# Patient Record
Sex: FEMALE | Race: WHITE | URBAN - METROPOLITAN AREA
[De-identification: names, ages, dates, MRNs, and addresses within clinical notes are randomized per-mention and may not be internally consistent; named-entity substitution may affect disease eponyms.]

---

## 2017-02-21 ENCOUNTER — OFFICE VISIT (OUTPATIENT)
Dept: FAMILY MEDICINE | Facility: CLINIC | Age: 62
End: 2017-02-21
Payer: COMMERCIAL

## 2017-02-21 VITALS
OXYGEN SATURATION: 96 % | BODY MASS INDEX: 26.37 KG/M2 | WEIGHT: 168 LBS | DIASTOLIC BLOOD PRESSURE: 74 MMHG | HEART RATE: 69 BPM | SYSTOLIC BLOOD PRESSURE: 125 MMHG | TEMPERATURE: 98.1 F | HEIGHT: 67 IN

## 2017-02-21 DIAGNOSIS — M77.02 EPICONDYLITIS ELBOW, MEDIAL, LEFT: ICD-10-CM

## 2017-02-21 DIAGNOSIS — E78.5 HYPERLIPIDEMIA LDL GOAL <130: ICD-10-CM

## 2017-02-21 DIAGNOSIS — M70.52 BURSITIS OF LEFT KNEE: ICD-10-CM

## 2017-02-21 DIAGNOSIS — E03.4 HYPOTHYROIDISM DUE TO ACQUIRED ATROPHY OF THYROID: ICD-10-CM

## 2017-02-21 DIAGNOSIS — N95.1 PERIMENOPAUSAL: ICD-10-CM

## 2017-02-21 DIAGNOSIS — M70.51 BURSITIS OF RIGHT KNEE: ICD-10-CM

## 2017-02-21 DIAGNOSIS — M76.31 ILIOTIBIAL BAND TENDINITIS OF RIGHT SIDE: ICD-10-CM

## 2017-02-21 DIAGNOSIS — K21.9 GASTROESOPHAGEAL REFLUX DISEASE WITHOUT ESOPHAGITIS: ICD-10-CM

## 2017-02-21 DIAGNOSIS — Z76.89 ENCOUNTER TO ESTABLISH CARE: Primary | ICD-10-CM

## 2017-02-21 PROCEDURE — 99203 OFFICE O/P NEW LOW 30 MIN: CPT | Performed by: INTERNAL MEDICINE

## 2017-02-21 RX ORDER — LOVASTATIN 40 MG
TABLET ORAL
Refills: 1 | COMMUNITY
Start: 2016-08-23

## 2017-02-21 RX ORDER — LATANOPROST 50 UG/ML
SOLUTION/ DROPS OPHTHALMIC
Refills: 6 | COMMUNITY
Start: 2016-12-30

## 2017-02-21 RX ORDER — LIOTHYRONINE SODIUM 25 UG/1
TABLET ORAL
Refills: 2 | COMMUNITY
Start: 2017-02-13

## 2017-02-21 RX ORDER — MULTIPLE VITAMINS W/ MINERALS TAB 9MG-400MCG
1 TAB ORAL DAILY
COMMUNITY

## 2017-02-21 RX ORDER — ATORVASTATIN CALCIUM 40 MG/1
TABLET, FILM COATED ORAL
Refills: 1 | COMMUNITY
Start: 2017-02-13

## 2017-02-21 RX ORDER — FLUOXETINE 10 MG/1
CAPSULE ORAL
Refills: 0 | COMMUNITY
Start: 2017-02-13

## 2017-02-21 RX ORDER — CALCIUM CARBONATE 500(1250)
1200 TABLET ORAL 2 TIMES DAILY
COMMUNITY

## 2017-02-21 RX ORDER — ESTROGENS, CONJUGATED 0.45 MG/1
TABLET, FILM COATED ORAL
Refills: 11 | COMMUNITY
Start: 2016-12-30

## 2017-02-21 NOTE — MR AVS SNAPSHOT
"              After Visit Summary   2/21/2017    Rosey Urban    MRN: [id number]           Patient Information     Date Of Birth          1955        Visit Information        Provider Department      2/21/2017 8:00 AM Titus Villegas MD [facility]        Today's Diagnoses     Encounter to establish care    -  1    Epicondylitis elbow, medial, left        Perimenopausal        Gastroesophageal reflux disease without esophagitis        Hypothyroidism due to acquired atrophy of thyroid        Hyperlipidemia LDL goal <130        Bursitis of left knee        Bursitis of right knee        Iliotibial band tendinitis of right side           Follow-ups after your visit        Who to contact     If you have questions or need follow up information about today's clinic visit or your schedule please contact [facility] directly at [phone].  Normal or non-critical lab and imaging results will be communicated to you by MyChart, letter or phone within 4 business days after the clinic has received the results. If you do not hear from us within 7 days, please contact the clinic through MyChart or phone. If you have a critical or abnormal lab result, we will notify you by phone as soon as possible.  Submit refill requests through Marco Vasco or call your pharmacy and they will forward the refill request to us. Please allow 3 business days for your refill to be completed.          Additional Information About Your Visit        MyChart Information     Marco Vasco lets you send messages to your doctor, view your test results, renew your prescriptions, schedule appointments and more. To sign up, go to [email or web address] . Click on \"Log in\" on the left side of the screen, which will take you to the Welcome page. Then click on \"Sign up Now\" on the right side of the page.     You will be asked to enter the access code listed below, as well as some personal information. Please follow the directions to create " "your username and password.     Your access code is: [email or web address]  Expires: 2017  4:36 AM     Your access code will  in 90 days. If you need help or a new code, please call your [facility] or [phone].        Care EveryWhere ID     This is your Care EveryWhere ID. This could be used by other organizations to access your [facility] medical records  [id number]        Your Vitals Were     Pulse Temperature Height Pulse Oximetry Breastfeeding? BMI (Body Mass Index)    69 98.1  F (36.7  C) (Oral) 5' 7\" (1.702 m) 96% No 26.31 kg/m2       Blood Pressure from Last 3 Encounters:   17 125/74    Weight from Last 3 Encounters:   17 168 lb (76.2 kg)              Today, you had the following     No orders found for display       Primary Care Provider Office Phone # Fax #    Titus Villegas [phone]       [facility] [address] [facility] [address]  [facility] [address]        Thank you!     Thank you for choosing [facility]  for your care. Our goal is always to provide you with excellent care. Hearing back from our patients is one way we can continue to improve our services. Please take a few minutes to complete the written survey that you may receive in the mail after your visit with us. Thank you!             Your Updated Medication List - Protect others around you: Learn how to safely use, store and throw away your medicines at www.disposemymeds.org.          This list is accurate as of: 17 11:59 PM.  Always use your most recent med list.                   Brand Name Dispense Instructions for use    atorvastatin 40 MG tablet    LIPITOR     TK 1 T D       calcium carbonate 500 MG tablet    OS-COLT 500 mg [place]. [place]     Take 1,200 mg by mouth 2 times daily       FLUoxetine 10 MG capsule    PROzac     TK 1 C PO QD       latanoprost 0.005 % ophthalmic solution    XALATAN     INT 1 GTT IN OU QPM       liothyronine 25 MCG tablet    CYTOMEL     TK 1 T PO QD       " lovastatin 40 MG tablet    MEVACOR     TK 1 T PO D       Multi-vitamin Tabs tablet      Take 1 tablet by mouth daily       omeprazole 20 MG CR capsule    priLOSEC     TK ONE C PO D       PREMARIN 0.45 MG tablet   Generic drug:  estrogens (conjugated)      TK 1 T PO D       progesterone 100 MG capsule    PROMETRIUM     TK 1 C PO QHS

## 2017-02-21 NOTE — PROGRESS NOTES
"  SUBJECTIVE:                                                    Rosey Urban is a 61 year old female who presents to clinic today for the following health issues:    Mrs. Urban presents to the clinic to establish care.     Patient complains of left elbow pain along the medial epicondyl that is sensitive to the touch for the last 2 months. She reports pain is exacerbated when doing forearm curls and pushups. She notes routinely exercising 5 days a week, with 4 days including the irritating exercises. Patient has occasionally attempted ice without relief of pain.    She also complains of bilateral knee pain, right greater than left, on the inside that \"shoots down.\" She reports pain has been present for 2-3 weeks and is exacerbated by walking and stairs. She reports, while walking yesterday, she had right hip pain with radiation to the back of the leg that has since resolved. Patient reports taking 2 tablets Advil twice a day with sufficient pain relief.    Patient also complains of intermittent right side back pain. She reports pain or \"tightness\" is not correlated with exercise and is alleviated with stretching. She note that she stretches daily as a prophylactic. Denies radiation of pain.    Chief Complaint   Patient presents with     Establish Care     Musculoskeletal Problem   Past medical history  Hyperlipidemia  Hypothyroidism  Gastroesophageal reflux disorder  Postmenopausal  Depression    Problem list and histories reviewed & adjusted, as indicated.  Additional history: as documented    Current Outpatient Prescriptions   Medication Sig Dispense Refill     atorvastatin (LIPITOR) 40 MG tablet TK 1 T D  1     omeprazole (PRILOSEC) 20 MG CR capsule TK ONE C PO D  1     PREMARIN 0.45 MG tablet TK 1 T PO D  11     progesterone (PROMETRIUM) 100 MG capsule TK 1 C PO QHS  6     FLUoxetine (PROZAC) 10 MG capsule TK 1 C PO QD  0     lovastatin (MEVACOR) 40 MG tablet TK 1 T PO D  1     latanoprost (XALATAN) 0.005 % " "ophthalmic solution INT 1 GTT IN OU QPM  6     liothyronine (CYTOMEL) 25 MCG tablet TK 1 T PO QD  2     calcium carbonate (OS-COLT 500 MG [place]. [place]) 500 MG tablet Take 1,200 mg by mouth 2 times daily       multivitamin, therapeutic with minerals (MULTI-VITAMIN) TABS tablet Take 1 tablet by mouth daily       Allergies   Allergen Reactions     Amoxicillin Rash     Latex Rash       ROS:  Constitutional, HEENT, cardiovascular, pulmonary, gi and gu systems are negative, except as otherwise noted.    This document serves as a record of the services and decisions personally performed and made by Titus Villegas MD. It was created on his/her behalf by Rossana Thompson, a trained medical scribe. The creation of this document is based the provider's statements to the medical scribe.  Scribe Rossana Thompson 9:06 AM, February 21, 2017     OBJECTIVE:                                                    /74 (BP Location: Right arm, Patient Position: Chair, Cuff Size: Adult Regular)  Pulse 69  Temp 98.1  F (36.7  C) (Oral)  Ht 1.702 m (5' 7\")  Wt 76.2 kg (168 lb)  SpO2 96%  Breastfeeding? No  BMI 26.31 kg/m2  Body mass index is 26.31 kg/(m^2).  Neck was supple without adenopathy or thyromegaly her carotids were normal without bruits  Chest clear to auscultation and percussion  Cardiovascular S1 and S2 are physiologic without murmurs or gallops  Abdomen bowel sounds were normal.  There is no palpable mass or organomegaly  Extremities nontender without any edema  Pulses pedal pulses are as described otherwise his pulses are bilaterally symmetrical throughout without bruits  Left elbow shows medial epicondylitis  Back was nontender, straight leg negative, right sided tenderness in the region of the right sacroiliac joint, hips normal, external ROM was 60 degrees bilaterally   Knees ligaments were all intact,  tenderness inferior to the medial joint line with associated prominence of the bursa  Skin without significant abnormality "       ASSESSMENT/PLAN:                                                    1. Encounter to establish care      2. Epicondylitis elbow, medial, left      3. Perimenopausal      4. Gastroesophageal reflux disease without esophagitis      5. Hypothyroidism due to acquired atrophy of thyroid      6. Hyperlipidemia LDL goal <130      7. Bursitis of left knee  Iliotibial bursa    8. Bursitis of right knee  Iliotibial Bursa      9. Iliotibial band tendinitis of right side      Advised to eliminate treadmill and using a bike Instead. Also advised to reduce forearm curls in weight and number and use a forearm band. Take Aleve 220 mg-440 mg BID with food.    Follow up as needed is there is no improvement.    Titus Villegas MD  [facility]    The information in this document, created by the medical scribe for me, accurately reflects the services I personally performed and the decisions made by me. I have reviewed and approved this document for accuracy prior to leaving the patient care area.  Titus Villegas MD  8:45 AM, 02/21/17

## 2017-02-21 NOTE — NURSING NOTE
"Chief Complaint   Patient presents with     Establish Care     Musculoskeletal Problem       Initial /74 (BP Location: Right arm, Patient Position: Chair, Cuff Size: Adult Regular)  Pulse 69  Temp 98.1  F (36.7  C) (Oral)  Ht 5' 7\" (1.702 m)  Wt 168 lb (76.2 kg)  SpO2 96%  Breastfeeding? No  BMI 26.31 kg/m2 Estimated body mass index is 26.31 kg/(m^2) as calculated from the following:    Height as of this encounter: 5' 7\" (1.702 m).    Weight as of this encounter: 168 lb (76.2 kg).  Medication Reconciliation: complete       COLIN Mac MA February 21, 2017 8:23 AM      "

## 2017-06-20 ENCOUNTER — OFFICE VISIT (OUTPATIENT)
Dept: FAMILY MEDICINE | Facility: CLINIC | Age: 62
End: 2017-06-20
Payer: COMMERCIAL

## 2017-06-20 ENCOUNTER — RADIANT APPOINTMENT (OUTPATIENT)
Dept: GENERAL RADIOLOGY | Facility: CLINIC | Age: 62
End: 2017-06-20
Attending: INTERNAL MEDICINE
Payer: COMMERCIAL

## 2017-06-20 VITALS
HEIGHT: 67 IN | WEIGHT: 171 LBS | HEART RATE: 91 BPM | OXYGEN SATURATION: 98 % | BODY MASS INDEX: 26.84 KG/M2 | DIASTOLIC BLOOD PRESSURE: 89 MMHG | TEMPERATURE: 97.8 F | SYSTOLIC BLOOD PRESSURE: 152 MMHG

## 2017-06-20 DIAGNOSIS — M17.11 PRIMARY OSTEOARTHRITIS OF RIGHT KNEE: ICD-10-CM

## 2017-06-20 DIAGNOSIS — E78.5 HYPERLIPIDEMIA LDL GOAL <130: ICD-10-CM

## 2017-06-20 DIAGNOSIS — M22.2X1 PATELLOFEMORAL SYNDROME OF RIGHT KNEE: Primary | ICD-10-CM

## 2017-06-20 DIAGNOSIS — K21.9 GASTROESOPHAGEAL REFLUX DISEASE WITHOUT ESOPHAGITIS: ICD-10-CM

## 2017-06-20 DIAGNOSIS — E03.4 HYPOTHYROIDISM DUE TO ACQUIRED ATROPHY OF THYROID: ICD-10-CM

## 2017-06-20 PROCEDURE — 73562 X-RAY EXAM OF KNEE 3: CPT | Mod: RT

## 2017-06-20 PROCEDURE — 99213 OFFICE O/P EST LOW 20 MIN: CPT | Mod: 25 | Performed by: INTERNAL MEDICINE

## 2017-06-20 PROCEDURE — 20610 DRAIN/INJ JOINT/BURSA W/O US: CPT | Performed by: INTERNAL MEDICINE

## 2017-06-20 NOTE — PATIENT INSTRUCTIONS
Over the next week, begin low impact activities such as biking or walking on flat surfaces every other day for 15 minutes and increase exercise time in a stepwise fashion over the next several weeks. Avoid steep inclines and kneeling.    I encourage use of a patellofemoral band during activity.     Apply heat on the right knee for 20 minutes twice daily for 3 days.    Begin Aleve 2 tablets twice daily with food.

## 2017-06-20 NOTE — PROGRESS NOTES
SUBJECTIVE:                                                    Rosey Urban is a 61 year old female who presents to clinic today for the following health issues:      Musculoskeletal problem/pain  RIGHT KNEE      Duration: off and on since Feb 2017 but more consistent     Description  Location: RIGHT KNEE    Intensity:  Currently 1 while on tylenol, at its worse 6/10    Accompanying signs and symptoms: numbness , perhap from walking gingerly     History  Previous similar problem: no   Previous evaluation:  none    Precipitating or alleviating factors:  Trauma or overuse: YES perhaps exercise  Aggravating factors include: standing, exercise and overuse    Therapies tried and outcome: rest/inactivity, ice, support wrap (but aggravates)  and acetaminophen     Patient complains of right medial knee pain since 2/2017 which was intermittent but is now constant. She attributed pain to activity, including yoga, treadmill, bike and stairs but has since stopped all exercise without resolution.  Patient states knee is currently more swollen than ever before. She notes she has used Advil 4 tablets daily, Aleve 3 tablets daily or Tylenol 6 tablets daily with moderate resolution.    Problem list and histories reviewed & adjusted, as indicated.  Additional history: as documented    Current Outpatient Prescriptions   Medication Sig Dispense Refill     atorvastatin (LIPITOR) 40 MG tablet TK 1 T D  1     omeprazole (PRILOSEC) 20 MG CR capsule TK ONE C PO D  1     PREMARIN 0.45 MG tablet TK 1 T PO D  11     progesterone (PROMETRIUM) 100 MG capsule TK 1 C PO QHS  6     FLUoxetine (PROZAC) 10 MG capsule TK 1 C PO QD  0     lovastatin (MEVACOR) 40 MG tablet TK 1 T PO D  1     latanoprost (XALATAN) 0.005 % ophthalmic solution INT 1 GTT IN OU QPM  6     liothyronine (CYTOMEL) 25 MCG tablet TK 1 T PO QD  2     calcium carbonate (OS-COLT 500 MG [place]. [place]) 500 MG tablet Take 1,200 mg by mouth 2 times daily       multivitamin, therapeutic with  "minerals (MULTI-VITAMIN) TABS tablet Take 1 tablet by mouth daily       Allergies   Allergen Reactions     Amoxicillin Rash     Latex Rash       Reviewed and updated as needed this visit by clinical staff  Tobacco  Soc Hx      Reviewed and updated as needed this visit by Provider         ROS:  Constitutional, HEENT, cardiovascular, pulmonary, gi and gu systems are negative, except as otherwise noted.    This document serves as a record of the services and decisions personally performed and made by Titus Villegas MD. It was created on his/her behalf by Rossana Thompson, a trained medical scribe. The creation of this document is based the provider's statements to the medical scribe.  Scribe Rossana Thompson 6:09 PM, June 20, 2017     OBJECTIVE:                                                    /89 (BP Location: Right arm, Patient Position: Chair, Cuff Size: Adult Regular)  Pulse 91  Temp 97.8  F (36.6  C) (Oral)  Ht 1.702 m (5' 7\")  Wt 77.6 kg (171 lb)  SpO2 98%  Breastfeeding? No  BMI 26.78 kg/m2  Body mass index is 26.78 kg/(m^2).  Neck was supple without adenopathy or thyromegaly her carotids were normal without bruits  Chest clear to auscultation and percussion  Cardiovascular S1 and S2 are physiologic without murmurs or gallops  Abdomen bowel sounds were normal.  There is no palpable mass or organomegaly  Extremities nontender without any edema  Right knee has tenderness of the medial joint line. Small effusion and the ligaments intact draw sign negative.  Pulses pedal pulses are as described otherwise his pulses are bilaterally symmetrical throughout without bruits  Skin without significant abnormality     Right knee xray: small medial bone spur    Procedure: The right knee was cleaned and prepped in a sterile fashion and a 20 gauge needle was  entered in the superior lateral joint space and 60 mg of medrol marcaine and lido was injected from a lateral approach without difficultly. "     ASSESSMENT/PLAN:                                                    1. Patellofemoral syndrome of right knee  Over the next week, begin low impact activities such as biking or walking on flat surfaces every other day for 15 minutes and increase exercise time in a stepwise fashion over the next several weeks. Avoid steep inclines and kneeling. I encourage use of a patellofemoral band during activity. Apply heat on the right knee for 20 minutes twice daily for 3 days. Begin Aleve 2 tablets twice daily with food    2. Gastroesophageal reflux disease without esophagitis    3. Hypothyroidism due to acquired atrophy of thyroid    4. Hyperlipidemia LDL goal <130    5. Primary osteoarthritis of right knee  - XR Knee Right 3 Views; Future  .    If symptoms fail to improve or are worsening return for follow up.     Titus Villegas MD  [facility]    The information in this document, created by the medical scribe for me, accurately reflects the services I personally performed and the decisions made by me. I have reviewed and approved this document for accuracy prior to leaving the patient care area.  Titus Villegas MD  6:06 PM, 06/20/17

## 2017-06-20 NOTE — MR AVS SNAPSHOT
After Visit Summary   6/20/2017    Rosey Urban    MRN: [id number]           Patient Information     Date Of Birth          1955        Visit Information        Provider Department      6/20/2017 6:00 PM Titus Villegas MD [facility]        Today's Diagnoses     Patellofemoral syndrome of right knee    -  1    Gastroesophageal reflux disease without esophagitis        Hypothyroidism due to acquired atrophy of thyroid        Hyperlipidemia LDL goal <130        Primary osteoarthritis of right knee          Care Instructions    Over the next week, begin low impact activities such as biking or walking on flat surfaces every other day for 15 minutes and increase exercise time in a stepwise fashion over the next several weeks. Avoid steep inclines and kneeling.    I encourage use of a patellofemoral band during activity.     Apply heat on the right knee for 20 minutes twice daily for 3 days.    Begin Aleve 2 tablets twice daily with food.          Follow-ups after your visit        Who to contact     If you have questions or need follow up information about today's clinic visit or your schedule please contact [facility] directly at [phone].  Normal or non-critical lab and imaging results will be communicated to you by Chill.comhart, letter or phone within 4 business days after the clinic has received the results. If you do not hear from us within 7 days, please contact the clinic through Viewabillt or phone. If you have a critical or abnormal lab result, we will notify you by phone as soon as possible.  Submit refill requests through Hands or call your pharmacy and they will forward the refill request to us. Please allow 3 business days for your refill to be completed.          Additional Information About Your Visit        MyChart Information     Hands lets you send messages to your doctor, view your test results, renew your prescriptions, schedule appointments and more. To  "sign up, go to [email or web address] . Click on \"Log in\" on the left side of the screen, which will take you to the Welcome page. Then click on \"Sign up Now\" on the right side of the page.     You will be asked to enter the access code listed below, as well as some personal information. Please follow the directions to create your username and password.     Your access code is: [email or web address]  Expires: 2017 11:53 AM     Your access code will  in 90 days. If you need help or a new code, please call your [place] clinic or [phone].        Care EveryWhere ID     This is your Care EveryWhere ID. This could be used by other organizations to access your [place] medical records  [id number]        Your Vitals Were     Pulse Temperature Height Pulse Oximetry Breastfeeding? BMI (Body Mass Index)    91 97.8  F (36.6  C) (Oral) 5' 7\" (1.702 m) 98% No 26.78 kg/m2       Blood Pressure from Last 3 Encounters:   17 152/89   17 125/74    Weight from Last 3 Encounters:   17 171 lb (77.6 kg)   17 168 lb (76.2 kg)              We Performed the Following     DRAIN/INJECT LARGE JOINT/BURSA     METHYLPREDNISOLONE 80 MG INJ        Primary Care Provider Office Phone # Fax #    Titus Villegas [phone]       [facility] [address] [facility] [address] [address]  [facility] [address]        Equal Access to Services     [facility] Center[facility]TE : Hadii aad ku hadasho Soomaali, waaxda luqadaha, qaybta kaalmada aderayna, harry kemp . So [facility] [phone].    ATENCIÓN: Si habla español, tiene a patel disposición servicios gratuitos de asistencia lingüística. Llame al [phone].    We comply with applicable federal civil rights laws and [place] laws. We do not discriminate on the basis of race, color, national origin, age, disability sex, sexual orientation or gender identity.            Thank you!     Thank you for choosing [facility]  for your care. Our goal is " always to provide you with excellent care. Hearing back from our patients is one way we can continue to improve our services. Please take a few minutes to complete the written survey that you may receive in the mail after your visit with us. Thank you!             Your Updated Medication List - Protect others around you: Learn how to safely use, store and throw away your medicines at www.disposemymeds.org.          This list is accurate as of: 6/20/17 11:59 PM.  Always use your most recent med list.                   Brand Name Dispense Instructions for use Diagnosis    atorvastatin 40 MG tablet    LIPITOR     TK 1 T D        calcium carbonate 1250 MG tablet    OS-COLT 500 mg [place]. [place]     Take 1,200 mg by mouth 2 times daily        FLUoxetine 10 MG capsule    PROzac     TK 1 C PO QD        latanoprost 0.005 % ophthalmic solution    XALATAN     INT 1 GTT IN OU QPM        liothyronine 25 MCG tablet    CYTOMEL     TK 1 T PO QD        lovastatin 40 MG tablet    MEVACOR     TK 1 T PO D        Multi-vitamin Tabs tablet      Take 1 tablet by mouth daily        omeprazole 20 MG CR capsule    priLOSEC     TK ONE C PO D        PREMARIN 0.45 MG tablet   Generic drug:  estrogens (conjugated)      TK 1 T PO D        progesterone 100 MG capsule    PROMETRIUM     TK 1 C PO QHS

## 2018-01-21 ENCOUNTER — HEALTH MAINTENANCE LETTER (OUTPATIENT)
Age: 63
End: 2018-01-21

## 2020-03-10 ENCOUNTER — HEALTH MAINTENANCE LETTER (OUTPATIENT)
Age: 65
End: 2020-03-10

## 2020-12-27 ENCOUNTER — HEALTH MAINTENANCE LETTER (OUTPATIENT)
Age: 65
End: 2020-12-27

## 2021-04-24 ENCOUNTER — HEALTH MAINTENANCE LETTER (OUTPATIENT)
Age: 66
End: 2021-04-24

## 2021-10-09 ENCOUNTER — HEALTH MAINTENANCE LETTER (OUTPATIENT)
Age: 66
End: 2021-10-09

## 2022-03-26 ENCOUNTER — HEALTH MAINTENANCE LETTER (OUTPATIENT)
Age: 67
End: 2022-03-26

## 2022-05-21 ENCOUNTER — HEALTH MAINTENANCE LETTER (OUTPATIENT)
Age: 67
End: 2022-05-21

## 2022-09-11 ENCOUNTER — HEALTH MAINTENANCE LETTER (OUTPATIENT)
Age: 67
End: 2022-09-11

## 2023-06-03 ENCOUNTER — HEALTH MAINTENANCE LETTER (OUTPATIENT)
Age: 68
End: 2023-06-03